# Patient Record
Sex: FEMALE | Race: WHITE | NOT HISPANIC OR LATINO | RURAL
[De-identification: names, ages, dates, MRNs, and addresses within clinical notes are randomized per-mention and may not be internally consistent; named-entity substitution may affect disease eponyms.]

---

## 2020-12-22 ENCOUNTER — HISTORICAL (OUTPATIENT)
Dept: ADMINISTRATIVE | Facility: HOSPITAL | Age: 30
End: 2020-12-22

## 2020-12-22 LAB
AMPHET UR QL SCN: POSITIVE NG/ML
BACTERIA #/AREA URNS HPF: ABNORMAL /HPF
BARBITURATES UR QL SCN: NEGATIVE NG/ML
BENZODIAZ METAB UR QL SCN: POSITIVE NG/ML
BILIRUB UR QL STRIP: NEGATIVE MG/DL
CANNABINOIDS UR QL SCN: POSITIVE NG/ML
CLARITY UR: CLEAR
CLARITY UR: CLEAR
COCAINE UR QL SCN: NEGATIVE NG/ML
COLOR UR: ABNORMAL
COLOR UR: ABNORMAL
GLUCOSE UR STRIP-MCNC: NEGATIVE MG/DL
KETONES UR STRIP-SCNC: NEGATIVE MG/DL
LEUKOCYTE ESTERASE UR QL STRIP: ABNORMAL LEU/UL
NITRITE UR QL STRIP: POSITIVE
OPIATES UR QL SCN: POSITIVE NG/ML
PCP UR QL SCN: NEGATIVE NG/ML
PH UR STRIP: 6 [PH] (ref 5–8)
PROT UR QL STRIP: NEGATIVE MG/DL
RBC # UR STRIP: NEGATIVE ERY/UL
RBC #/AREA URNS HPF: ABNORMAL /HPF (ref 0–3)
SARS-COV-2 RNA AMPLIFICATION, QUAL: NEGATIVE
SP GR UR STRIP: <=1.005 (ref 1–1.03)
SQUAMOUS #/AREA URNS LPF: ABNORMAL /LPF
UROBILINOGEN UR STRIP-ACNC: 0.2 EU/DL
WBC #/AREA URNS HPF: ABNORMAL /HPF (ref 0–5)

## 2020-12-23 ENCOUNTER — HISTORICAL (OUTPATIENT)
Dept: ADMINISTRATIVE | Facility: HOSPITAL | Age: 30
End: 2020-12-23

## 2020-12-23 LAB
ABO: NORMAL
ANION GAP SERPL CALCULATED.3IONS-SCNC: 10 MMOL/L
ANTIBODY SCREEN: NORMAL
APTT PPP: 41.3 SECONDS (ref 25.2–37.3)
BASOPHILS # BLD AUTO: 0.04 X10E3/UL (ref 0–0.2)
BASOPHILS NFR BLD AUTO: 0.4 % (ref 0–1)
BUN SERPL-MCNC: 4 MG/DL (ref 7–18)
CALCIUM SERPL-MCNC: 8 MG/DL (ref 8.5–10.1)
CHLORIDE SERPL-SCNC: 99 MMOL/L (ref 98–107)
CO2 SERPL-SCNC: 29 MMOL/L (ref 21–32)
CREAT SERPL-MCNC: 0.58 MG/DL (ref 0.55–1.02)
EOSINOPHIL # BLD AUTO: 0.2 X10E3/UL (ref 0–0.5)
EOSINOPHIL NFR BLD AUTO: 1.8 % (ref 1–4)
ERYTHROCYTE [DISTWIDTH] IN BLOOD BY AUTOMATED COUNT: 13.3 % (ref 11.5–14.5)
GLUCOSE SERPL-MCNC: 136 MG/DL (ref 74–106)
HCT VFR BLD AUTO: 27.7 % (ref 38–47)
HGB BLD-MCNC: 9 G/DL (ref 12–16)
IMM GRANULOCYTES # BLD AUTO: 0.05 X10E3/UL (ref 0–0.04)
IMM GRANULOCYTES NFR BLD: 0.4 % (ref 0–0.4)
INR BLD: 1.18 (ref 0–3.3)
LYMPHOCYTES # BLD AUTO: 2.5 X10E3/UL (ref 1–4.8)
LYMPHOCYTES NFR BLD AUTO: 22.1 % (ref 27–41)
MCH RBC QN AUTO: 29.2 PG (ref 27–31)
MCHC RBC AUTO-ENTMCNC: 32.5 G/DL (ref 32–36)
MCV RBC AUTO: 89.9 FL (ref 80–96)
MONOCYTES # BLD AUTO: 0.84 X10E3/UL (ref 0–0.8)
MONOCYTES NFR BLD AUTO: 7.4 % (ref 2–6)
MPC BLD CALC-MCNC: 10 FL (ref 9.4–12.4)
NEUTROPHILS # BLD AUTO: 7.7 X10E3/UL (ref 1.8–7.7)
NEUTROPHILS NFR BLD AUTO: 67.9 % (ref 53–65)
NRBC # BLD AUTO: 0 X10E3/UL (ref 0–0)
NRBC, AUTO (.00): 0 /100 (ref 0–0)
PLATELET # BLD AUTO: 321 X10E3/UL (ref 150–400)
POTASSIUM SERPL-SCNC: 3.5 MMOL/L (ref 3.5–5.1)
PROTHROMBIN TIME: 14.4 SECONDS (ref 11.7–14.7)
RBC # BLD AUTO: 3.08 X10E6/UL (ref 4.2–5.4)
RH TYPE: NORMAL
SODIUM SERPL-SCNC: 134 MMOL/L (ref 136–145)
WBC # BLD AUTO: 11.33 X10E3/UL (ref 4.5–11)

## 2020-12-24 ENCOUNTER — HISTORICAL (OUTPATIENT)
Dept: ADMINISTRATIVE | Facility: HOSPITAL | Age: 30
End: 2020-12-24

## 2020-12-24 LAB
ANION GAP SERPL CALCULATED.3IONS-SCNC: 11 MMOL/L
BASOPHILS # BLD AUTO: 0.03 X10E3/UL (ref 0–0.2)
BASOPHILS NFR BLD AUTO: 0.3 % (ref 0–1)
BUN SERPL-MCNC: 2 MG/DL (ref 7–18)
CALCIUM SERPL-MCNC: 7.9 MG/DL (ref 8.5–10.1)
CHLORIDE SERPL-SCNC: 103 MMOL/L (ref 98–107)
CO2 SERPL-SCNC: 28 MMOL/L (ref 21–32)
CREAT SERPL-MCNC: 0.54 MG/DL (ref 0.55–1.02)
EOSINOPHIL # BLD AUTO: 0.17 X10E3/UL (ref 0–0.5)
EOSINOPHIL NFR BLD AUTO: 1.6 % (ref 1–4)
ERYTHROCYTE [DISTWIDTH] IN BLOOD BY AUTOMATED COUNT: 13.2 % (ref 11.5–14.5)
GLUCOSE SERPL-MCNC: 122 MG/DL (ref 74–106)
HCT VFR BLD AUTO: 28.9 % (ref 38–47)
HGB BLD-MCNC: 9.1 G/DL (ref 12–16)
IMM GRANULOCYTES # BLD AUTO: 0.08 X10E3/UL (ref 0–0.04)
IMM GRANULOCYTES NFR BLD: 0.8 % (ref 0–0.4)
LYMPHOCYTES # BLD AUTO: 2.06 X10E3/UL (ref 1–4.8)
LYMPHOCYTES NFR BLD AUTO: 19.9 % (ref 27–41)
MCH RBC QN AUTO: 29.4 PG (ref 27–31)
MCHC RBC AUTO-ENTMCNC: 31.5 G/DL (ref 32–36)
MCV RBC AUTO: 93.2 FL (ref 80–96)
MONOCYTES # BLD AUTO: 0.53 X10E3/UL (ref 0–0.8)
MONOCYTES NFR BLD AUTO: 5.1 % (ref 2–6)
MPC BLD CALC-MCNC: 9.7 FL (ref 9.4–12.4)
NEUTROPHILS # BLD AUTO: 7.47 X10E3/UL (ref 1.8–7.7)
NEUTROPHILS NFR BLD AUTO: 72.3 % (ref 53–65)
NRBC # BLD AUTO: 0 X10E3/UL (ref 0–0)
NRBC, AUTO (.00): 0 /100 (ref 0–0)
PLATELET # BLD AUTO: 280 X10E3/UL (ref 150–400)
POTASSIUM SERPL-SCNC: 3.4 MMOL/L (ref 3.5–5.1)
RBC # BLD AUTO: 3.1 X10E6/UL (ref 4.2–5.4)
REPORT: 38
REPORT: NORMAL
SODIUM SERPL-SCNC: 139 MMOL/L (ref 136–145)
WBC # BLD AUTO: 10.34 X10E3/UL (ref 4.5–11)

## 2020-12-25 ENCOUNTER — HISTORICAL (OUTPATIENT)
Dept: ADMINISTRATIVE | Facility: HOSPITAL | Age: 30
End: 2020-12-25

## 2020-12-25 LAB
ALBUMIN SERPL BCP-MCNC: 1.8 G/DL (ref 3.5–5)
ALBUMIN/GLOB SERPL: 0.5 {RATIO}
ALP SERPL-CCNC: 86 U/L (ref 37–98)
ALT SERPL W P-5'-P-CCNC: 23 U/L (ref 13–56)
ANION GAP SERPL CALCULATED.3IONS-SCNC: 10 MMOL/L
AST SERPL W P-5'-P-CCNC: 17 U/L (ref 15–37)
BASOPHILS # BLD AUTO: 0.01 X10E3/UL (ref 0–0.2)
BASOPHILS NFR BLD AUTO: 0.1 % (ref 0–1)
BILIRUB SERPL-MCNC: 0.3 MG/DL (ref 0–1.2)
BUN SERPL-MCNC: 3 MG/DL (ref 7–18)
BUN/CREAT SERPL: 6.8
CALCIUM SERPL-MCNC: 7.8 MG/DL (ref 8.5–10.1)
CHLORIDE SERPL-SCNC: 105 MMOL/L (ref 98–107)
CO2 SERPL-SCNC: 30 MMOL/L (ref 21–32)
CREAT SERPL-MCNC: 0.44 MG/DL (ref 0.55–1.02)
EOSINOPHIL # BLD AUTO: 0 X10E3/UL (ref 0–0.5)
EOSINOPHIL NFR BLD AUTO: 0 % (ref 1–4)
ERYTHROCYTE [DISTWIDTH] IN BLOOD BY AUTOMATED COUNT: 13 % (ref 11.5–14.5)
GLOBULIN SER-MCNC: 4 G/DL (ref 2–4)
GLUCOSE SERPL-MCNC: 149 MG/DL (ref 74–106)
HCT VFR BLD AUTO: 23.8 % (ref 38–47)
HGB BLD-MCNC: 7.5 G/DL (ref 12–16)
IMM GRANULOCYTES # BLD AUTO: 0.03 X10E3/UL (ref 0–0.04)
IMM GRANULOCYTES NFR BLD: 0.3 % (ref 0–0.4)
LYMPHOCYTES # BLD AUTO: 1.42 X10E3/UL (ref 1–4.8)
LYMPHOCYTES NFR BLD AUTO: 14.9 % (ref 27–41)
MCH RBC QN AUTO: 29.2 PG (ref 27–31)
MCHC RBC AUTO-ENTMCNC: 31.5 G/DL (ref 32–36)
MCV RBC AUTO: 92.6 FL (ref 80–96)
MONOCYTES # BLD AUTO: 0.5 X10E3/UL (ref 0–0.8)
MONOCYTES NFR BLD AUTO: 5.2 % (ref 2–6)
MPC BLD CALC-MCNC: 10.1 FL (ref 9.4–12.4)
NEUTROPHILS # BLD AUTO: 7.57 X10E3/UL (ref 1.8–7.7)
NEUTROPHILS NFR BLD AUTO: 79.5 % (ref 53–65)
NRBC # BLD AUTO: 0 X10E3/UL (ref 0–0)
NRBC, AUTO (.00): 0 /100 (ref 0–0)
PLATELET # BLD AUTO: 305 X10E3/UL (ref 150–400)
POTASSIUM SERPL-SCNC: 4.1 MMOL/L (ref 3.5–5.1)
PROT SERPL-MCNC: 5.8 G/DL (ref 6.4–8.2)
RBC # BLD AUTO: 2.57 X10E6/UL (ref 4.2–5.4)
SODIUM SERPL-SCNC: 141 MMOL/L (ref 136–145)
WBC # BLD AUTO: 9.53 X10E3/UL (ref 4.5–11)

## 2021-01-07 ENCOUNTER — HISTORICAL (OUTPATIENT)
Dept: ADMINISTRATIVE | Facility: HOSPITAL | Age: 31
End: 2021-01-07

## 2021-01-17 ENCOUNTER — HISTORICAL (OUTPATIENT)
Dept: ADMINISTRATIVE | Facility: HOSPITAL | Age: 31
End: 2021-01-17

## 2021-01-17 LAB
BACTERIA #/AREA URNS HPF: ABNORMAL /HPF
BASOPHILS # BLD AUTO: 0.04 X10E3/UL (ref 0–0.2)
BASOPHILS NFR BLD AUTO: 0.3 % (ref 0–1)
BILIRUB UR QL STRIP: NEGATIVE MG/DL
CLARITY UR: CLEAR
CLARITY UR: CLEAR
COLOR UR: ABNORMAL
COLOR UR: ABNORMAL
EOSINOPHIL # BLD AUTO: 0.3 X10E3/UL (ref 0–0.5)
EOSINOPHIL NFR BLD AUTO: 2.1 % (ref 1–4)
ERYTHROCYTE [DISTWIDTH] IN BLOOD BY AUTOMATED COUNT: 14.9 % (ref 11.5–14.5)
GLUCOSE UR STRIP-MCNC: NEGATIVE MG/DL
HCG UR QL IA.RAPID: NEGATIVE
HCT VFR BLD AUTO: 29.6 % (ref 38–47)
HGB BLD-MCNC: 8.9 G/DL (ref 12–16)
IMM GRANULOCYTES # BLD AUTO: 0.07 X10E3/UL (ref 0–0.04)
IMM GRANULOCYTES NFR BLD: 0.5 % (ref 0–0.4)
KETONES UR STRIP-SCNC: NEGATIVE MG/DL
LEUKOCYTE ESTERASE UR QL STRIP: ABNORMAL LEU/UL
LYMPHOCYTES # BLD AUTO: 2.69 X10E3/UL (ref 1–4.8)
LYMPHOCYTES NFR BLD AUTO: 19.2 % (ref 27–41)
MCH RBC QN AUTO: 27.1 PG (ref 27–31)
MCHC RBC AUTO-ENTMCNC: 30.1 G/DL (ref 32–36)
MCV RBC AUTO: 90 FL (ref 80–96)
MONOCYTES # BLD AUTO: 0.85 X10E3/UL (ref 0–0.8)
MONOCYTES NFR BLD AUTO: 6.1 % (ref 2–6)
MPC BLD CALC-MCNC: 10 FL (ref 9.4–12.4)
NEUTROPHILS # BLD AUTO: 10.05 X10E3/UL (ref 1.8–7.7)
NEUTROPHILS NFR BLD AUTO: 71.8 % (ref 53–65)
NITRITE UR QL STRIP: POSITIVE
NRBC # BLD AUTO: 0 X10E3/UL (ref 0–0)
NRBC, AUTO (.00): 0 /100 (ref 0–0)
PH UR STRIP: 5.5 PH UNITS (ref 5–8)
PLATELET # BLD AUTO: 453 X10E3/UL (ref 150–400)
PROT UR QL STRIP: NEGATIVE MG/DL
RBC # BLD AUTO: 3.29 X10E6/UL (ref 4.2–5.4)
RBC # UR STRIP: NEGATIVE ERY/UL
RBC #/AREA URNS HPF: ABNORMAL /HPF (ref 0–3)
SP GR UR STRIP: >=1.03 (ref 1–1.03)
SQUAMOUS #/AREA URNS LPF: ABNORMAL /LPF
UROBILINOGEN UR STRIP-ACNC: 0.2 EU/DL
WBC # BLD AUTO: 14 X10E3/UL (ref 4.5–11)
WBC #/AREA URNS HPF: ABNORMAL /HPF (ref 0–5)

## 2021-01-19 LAB
REPORT: 38
REPORT: NORMAL

## 2023-10-31 ENCOUNTER — HOSPITAL ENCOUNTER (EMERGENCY)
Facility: HOSPITAL | Age: 33
Discharge: HOME | End: 2023-10-31
Attending: EMERGENCY MEDICINE
Payer: MEDICARE

## 2023-10-31 VITALS
SYSTOLIC BLOOD PRESSURE: 98 MMHG | HEART RATE: 62 BPM | BODY MASS INDEX: 26.13 KG/M2 | WEIGHT: 142 LBS | RESPIRATION RATE: 20 BRPM | DIASTOLIC BLOOD PRESSURE: 55 MMHG | TEMPERATURE: 98 F | OXYGEN SATURATION: 100 % | HEIGHT: 62 IN

## 2023-10-31 DIAGNOSIS — R11.2 NAUSEA AND VOMITING, UNSPECIFIED VOMITING TYPE: ICD-10-CM

## 2023-10-31 DIAGNOSIS — F11.93 NARCOTIC WITHDRAWAL: Primary | ICD-10-CM

## 2023-10-31 DIAGNOSIS — R19.7 DIARRHEA, UNSPECIFIED TYPE: ICD-10-CM

## 2023-10-31 PROCEDURE — 63600175 PHARM REV CODE 636 W HCPCS: Performed by: EMERGENCY MEDICINE

## 2023-10-31 PROCEDURE — 99284 EMERGENCY DEPT VISIT MOD MDM: CPT | Mod: ,,, | Performed by: EMERGENCY MEDICINE

## 2023-10-31 PROCEDURE — 25000003 PHARM REV CODE 250: Performed by: EMERGENCY MEDICINE

## 2023-10-31 PROCEDURE — 99284 EMERGENCY DEPT VISIT MOD MDM: CPT | Mod: 25

## 2023-10-31 PROCEDURE — 96365 THER/PROPH/DIAG IV INF INIT: CPT

## 2023-10-31 PROCEDURE — 99284 PR EMERGENCY DEPT VISIT,LEVEL IV: ICD-10-PCS | Mod: ,,, | Performed by: EMERGENCY MEDICINE

## 2023-10-31 PROCEDURE — 96375 TX/PRO/DX INJ NEW DRUG ADDON: CPT

## 2023-10-31 RX ORDER — PROMETHAZINE HYDROCHLORIDE 25 MG/1
25 SUPPOSITORY RECTAL EVERY 6 HOURS PRN
Qty: 12 SUPPOSITORY | Refills: 0 | Status: SHIPPED | OUTPATIENT
Start: 2023-10-31

## 2023-10-31 RX ORDER — PROMETHAZINE HYDROCHLORIDE 25 MG/1
25 TABLET ORAL EVERY 6 HOURS PRN
Qty: 16 TABLET | Refills: 0 | Status: SHIPPED | OUTPATIENT
Start: 2023-10-31

## 2023-10-31 RX ORDER — MORPHINE SULFATE 4 MG/ML
8 INJECTION, SOLUTION INTRAMUSCULAR; INTRAVENOUS
Status: COMPLETED | OUTPATIENT
Start: 2023-10-31 | End: 2023-10-31

## 2023-10-31 RX ADMIN — MORPHINE SULFATE 8 MG: 4 INJECTION, SOLUTION INTRAMUSCULAR; INTRAVENOUS at 08:10

## 2023-10-31 RX ADMIN — SODIUM CHLORIDE 1000 ML: 9 INJECTION, SOLUTION INTRAVENOUS at 09:10

## 2023-10-31 RX ADMIN — PROMETHAZINE HYDROCHLORIDE 25 MG: 25 INJECTION INTRAMUSCULAR; INTRAVENOUS at 09:10

## 2023-10-31 NOTE — DISCHARGE INSTRUCTIONS
Follow up in clinic with pain management tomorrow as scheduled.  Return to emergency department for any worsening or further problems.  Take Phenergan as needed for nausea and vomiting.

## 2023-10-31 NOTE — ED PROVIDER NOTES
Encounter Date: 10/31/2023       History     Chief Complaint   Patient presents with    Vomiting    Addiction Problem     Patient is a 33-year-old female who normally takes for 10 mg Percocets a day.  Four days ago she lost her Percocet and has had nausea, vomiting, and diarrhea since that time.  Patient did find to Percocet pills but states she was unable to keep them down.  Patient denies fever, cough, or other acute problems or complaints.  Patient believes her symptoms are all related to narcotic withdrawal.  Patient states this has happened to her once in the past.      Review of patient's allergies indicates:  No Known Allergies  No past medical history on file.  No past surgical history on file.  No family history on file.     Review of Systems   Gastrointestinal:  Positive for abdominal pain, diarrhea, nausea and vomiting.   All other systems reviewed and are negative.      Physical Exam     Initial Vitals   BP Pulse Resp Temp SpO2   10/31/23 0810 10/31/23 0810 10/31/23 0810 10/31/23 0810 10/31/23 0812   108/74 79 17 97.5 °F (36.4 °C) 98 %      MAP       --                Physical Exam    Nursing note and vitals reviewed.  Constitutional: She appears well-developed and well-nourished.   HENT:   Head: Normocephalic.   Eyes: Pupils are equal, round, and reactive to light.   Cardiovascular:  Normal rate.           Pulmonary/Chest: Breath sounds normal.   Abdominal: Abdomen is soft.     Neurological: She is alert.   Skin: Skin is warm. Capillary refill takes less than 2 seconds.   Psychiatric: She has a normal mood and affect.         Medical Screening Exam   See Full Note    ED Course   Procedures  Labs Reviewed - No data to display       Imaging Results    None          Medications   sodium chloride 0.9% bolus 1,000 mL 1,000 mL (has no administration in time range)   morphine injection 8 mg (has no administration in time range)   promethazine (PHENERGAN) 25 mg in dextrose 5 % (D5W) 50 mL IVPB (has no  administration in time range)     Medical Decision Making  Risk  Prescription drug management.               ED Course as of 10/31/23 0859   Tue Oct 31, 2023   2398 Medical decision-making:  Differential diagnosis includes nausea, vomiting, abdominal pain, diarrhea, narcotic withdrawal symptoms.  No labs or imaging were performed on this patient.  Patient was treated in the emergency department with 1 L bolus of saline, 25 mg of Phenergan IV, and 8 mg of morphine IV which resulted in improved symptoms. [BB]      ED Course User Index  [BB] Francis Mac MD                    Clinical Impression:   Final diagnoses:  [F11.93] Narcotic withdrawal (Primary)  [R11.2] Nausea and vomiting, unspecified vomiting type  [R19.7] Diarrhea, unspecified type        ED Disposition Condition    Discharge Stable          ED Prescriptions       Medication Sig Dispense Start Date End Date Auth. Provider    promethazine (PHENERGAN) 25 MG tablet Take 1 tablet (25 mg total) by mouth every 6 (six) hours as needed. 16 tablet 10/31/2023 -- Francis Mac MD    promethazine (PHENERGAN) 25 MG suppository Place 1 suppository (25 mg total) rectally every 6 (six) hours as needed. 12 suppository 10/31/2023 -- Francis Mac MD          Follow-up Information    None          Francis Mac MD  10/31/23 0859

## 2023-10-31 NOTE — ED TRIAGE NOTES
Pt reports losing her percocet 4 days ago and finding them yesterday. She reports she started withdrawing and vomiting. She reports she took them yesterday but has still been vomiting.

## 2024-03-05 DIAGNOSIS — M79.651 RIGHT THIGH PAIN: Primary | ICD-10-CM

## 2024-06-19 ENCOUNTER — OFFICE VISIT (OUTPATIENT)
Dept: WOUND CARE | Facility: CLINIC | Age: 34
End: 2024-06-19
Attending: FAMILY MEDICINE
Payer: MEDICARE

## 2024-06-19 VITALS
SYSTOLIC BLOOD PRESSURE: 118 MMHG | RESPIRATION RATE: 18 BRPM | TEMPERATURE: 97 F | HEART RATE: 102 BPM | DIASTOLIC BLOOD PRESSURE: 78 MMHG

## 2024-06-19 DIAGNOSIS — L89.314 PRESSURE INJURY OF RIGHT BUTTOCK, STAGE 4: ICD-10-CM

## 2024-06-19 DIAGNOSIS — L97.912 NON-PRESSURE CHRONIC ULCER OF RIGHT LOWER LEG WITH FAT LAYER EXPOSED: Primary | ICD-10-CM

## 2024-06-19 DIAGNOSIS — I73.9 PVD (PERIPHERAL VASCULAR DISEASE): ICD-10-CM

## 2024-06-19 DIAGNOSIS — L89.154 PRESSURE ULCER OF SACRAL REGION, STAGE 4: ICD-10-CM

## 2024-06-19 DIAGNOSIS — L89.324 PRESSURE INJURY OF LEFT BUTTOCK, STAGE 4: ICD-10-CM

## 2024-06-19 PROCEDURE — 99214 OFFICE O/P EST MOD 30 MIN: CPT | Mod: PBBFAC | Performed by: NURSE PRACTITIONER

## 2024-06-19 PROCEDURE — 99204 OFFICE O/P NEW MOD 45 MIN: CPT | Mod: S$PBB,,, | Performed by: NURSE PRACTITIONER

## 2024-06-19 PROCEDURE — 87077 CULTURE AEROBIC IDENTIFY: CPT | Mod: ,,, | Performed by: CLINICAL MEDICAL LABORATORY

## 2024-06-19 PROCEDURE — 87075 CULTR BACTERIA EXCEPT BLOOD: CPT | Mod: ,,, | Performed by: CLINICAL MEDICAL LABORATORY

## 2024-06-19 PROCEDURE — 87186 SC STD MICRODIL/AGAR DIL: CPT | Mod: ,,, | Performed by: CLINICAL MEDICAL LABORATORY

## 2024-06-19 PROCEDURE — 87070 CULTURE OTHR SPECIMN AEROBIC: CPT | Mod: ,,, | Performed by: CLINICAL MEDICAL LABORATORY

## 2024-06-19 PROCEDURE — 87077 CULTURE AEROBIC IDENTIFY: CPT | Mod: XU,,, | Performed by: CLINICAL MEDICAL LABORATORY

## 2024-06-19 PROCEDURE — 99999 PR PBB SHADOW E&M-EST. PATIENT-LVL IV: CPT | Mod: PBBFAC,,, | Performed by: NURSE PRACTITIONER

## 2024-06-19 RX ORDER — GENTAMICIN SULFATE 1 MG/G
CREAM TOPICAL DAILY
Qty: 90 G | Refills: 2 | Status: SHIPPED | OUTPATIENT
Start: 2024-06-19

## 2024-06-19 RX ORDER — SULFAMETHOXAZOLE AND TRIMETHOPRIM 800; 160 MG/1; MG/1
1 TABLET ORAL 2 TIMES DAILY
Qty: 28 TABLET | Refills: 0 | Status: SHIPPED | OUTPATIENT
Start: 2024-06-19 | End: 2024-07-17

## 2024-06-19 RX ORDER — SILVER SULFADIAZINE 10 G/1000G
CREAM TOPICAL DAILY
Qty: 400 G | Refills: 2 | Status: SHIPPED | OUTPATIENT
Start: 2024-06-19

## 2024-06-19 NOTE — PROGRESS NOTES
KATY Vieira   [facility]  [address]  [facility] [address]  [phone]      PATIENT NAME: Radha Choi  : 1990  DATE: 24  MRN: [id number]      Billing Provider: KATY Vieira  Level of Service:   Patient PCP Information       Provider PCP Type    Primary Doctor No General            Reason for Visit / Chief Complaint: Pressure Ulcer and Wound Care (Left and right hip, sacral, right great toe, right ankle, left heel)       History of Present Illness / Problem Focused Workflow     Radha Choi is a 35 yo female presents to clinic with complaints of pressure ulcers to right and left buttock, sacral, bilateral lower extremities and ulcer with exposed hardware to right lower extremity. She reports that she has been managing wounds at home and that wounds to sacral and buttock are chronic in nature. Reports that she had hardware placed in  by Dr. Garrido. She states that hardware has been exposed for awhile but over the past few weeks she has noticed a strong odor and increased drainage. She had appointment and imaging scheduled in March with orthopedics but missed those appointments. Discussed importance of x-ray today and that hardware needs to be evaluated by orthopedic surgeon. Arterial doppler scheduled. Pertinent PMH includes paraplegia. Wound healing is complicated by multiple co-morbidities, poor vascular supply, immunosuppression, edema, heavy drainage, excessive wound moisture and macerated tissue, decreased granulation tissue, necrosis, large surface area, large volume, tract(s), undermining, fragile skin, and infection.            Review of Systems     Review of Systems   Constitutional:  Positive for activity change. Negative for chills and fever.   Respiratory:  Negative for chest tightness and shortness of breath.    Cardiovascular:  Positive for leg swelling. Negative for chest pain and palpitations.    Musculoskeletal:  Positive for arthralgias and joint swelling.   Skin:  Positive for color change and wound.        wound   Neurological:  Positive for weakness and numbness.   Psychiatric/Behavioral:  Negative for agitation, behavioral problems, confusion and self-injury.        Medical / Social / Family History   History reviewed. No pertinent past medical history.    History reviewed. No pertinent surgical history.    Social History  Ms. Radha Choi  reports that she has been smoking cigarettes. She has never used smokeless tobacco.    Family History  Ms.'s Radha Choi family history is not on file.    Medications and Allergies     Medications  Outpatient Medications Marked as Taking for the 6/19/24 encounter (Office Visit) with Gunjan Sharma FNP   Medication Sig Dispense Refill    gabapentin (NEURONTIN) 800 MG tablet Take 1 tablet four times a day for 30 day(s) 120 tablet 1    oxyCODONE-acetaminophen (PERCOCET)  mg per tablet Take 1 tablet by mouth 4 (four) times daily. 120 tablet 0    promethazine (PHENERGAN) 25 MG suppository Place 1 suppository (25 mg total) rectally every 6 (six) hours as needed. 12 suppository 0    promethazine (PHENERGAN) 25 MG tablet Take 1 tablet (25 mg total) by mouth every 6 (six) hours as needed. 16 tablet 0       Allergies  Review of patient's allergies indicates:  No Known Allergies    Physical Examination     Vitals:    06/19/24 1359   BP: 118/78   Pulse: 102   Resp: 18   Temp: 97.3 °F (36.3 °C)     Physical Exam  Vitals and nursing note reviewed.   Constitutional:       Appearance: Normal appearance.   HENT:      Head: Normocephalic.   Cardiovascular:      Rate and Rhythm: Normal rate and regular rhythm.      Pulses: Normal pulses.      Heart sounds: Normal heart sounds.   Pulmonary:      Effort: Pulmonary effort is normal. No respiratory distress.   Chest:      Chest wall: No tenderness.   Musculoskeletal:         General: Swelling present.      Right  lower leg: Edema present.      Left lower leg: Edema present.   Skin:     General: Skin is warm and dry.      Findings: Erythema present.      Comments: See LDA for photos/measurements   Neurological:      General: No focal deficit present.      Mental Status: She is alert and oriented to person, place, and time. Mental status is at baseline.   Psychiatric:         Mood and Affect: Mood normal.         Behavior: Behavior normal.         Thought Content: Thought content normal.         Judgment: Judgment normal.       Assessment and Plan             Wound 06/19/24 1330 Abscess Right lower;medial Leg #1 (Active)   06/19/24 1330   Present on Original Admission: Y   Primary Wound Type: Abscess   Side: Right   Orientation: lower;medial   Location: Leg   Wound Approximate Age at First Assessment (Weeks): 8 weeks   Wound Number: #1   Is this injury device related?:    Incision Type:    Closure Method:    Wound Description (Comments):    Type:    Additional Comments:    Ankle-Brachial Index:    Pulses:    Removal Indication and Assessment:    Wound Outcome:    Wound Image   06/19/24 1346   Drainage Amount Moderate 06/19/24 1346   Drainage Characteristics/Odor Yellow 06/19/24 1346   Appearance Pink;Red 06/19/24 1346   Black (%), Wound Tissue Color 0 % 06/19/24 1346   Red (%), Wound Tissue Color 80 % 06/19/24 1346   Yellow (%), Wound Tissue Color 20 % 06/19/24 1346   Periwound Area Intact 06/19/24 1346   Wound Edges Irregular 06/19/24 1346   Wound Length (cm) 1.8 cm 06/19/24 1346   Wound Width (cm) 1.8 cm 06/19/24 1346   Wound Depth (cm) 0.4 cm 06/19/24 1346   Wound Volume (cm^3) 1.296 cm^3 06/19/24 1346   Wound Surface Area (cm^2) 3.24 cm^2 06/19/24 1346   Care Cleansed with:;Antimicrobial agent 06/19/24 1346   Dressing Applied;Hydrofiber;Gauze;Rolled gauze 06/19/24 1346            Wound 06/19/24 1332 Traumatic Right plantar Toe, first #2 (Active)   06/19/24 1332   Present on Original Admission: Y   Primary Wound Type:  Traumatic   Side: Right   Orientation: plantar   Location: Toe, first   Wound Approximate Age at First Assessment (Weeks): 8 weeks   Wound Number: #2   Is this injury device related?:    Incision Type:    Closure Method:    Wound Description (Comments):    Type:    Additional Comments:    Ankle-Brachial Index:    Pulses:    Removal Indication and Assessment:    Wound Outcome:    Wound Image   06/19/24 1350   Dressing Appearance Open to air;No dressing 06/19/24 1350   Wound Length (cm) 3.8 cm 06/19/24 1350   Wound Width (cm) 2.5 cm 06/19/24 1350   Wound Depth (cm) 0.2 cm 06/19/24 1350   Wound Volume (cm^3) 1.9 cm^3 06/19/24 1350   Wound Surface Area (cm^2) 9.5 cm^2 06/19/24 1350   Care Cleansed with:;Soap and water;Applied:;Moisturizing agent;Other (see comments) 06/19/24 1350   Dressing Applied;Gauze;Rolled gauze;Other (comment) 06/19/24 1350   Periwound Care Moisture barrier applied 06/19/24 1350            Wound 06/19/24 1333 Pressure Injury Left Heel #3 (Active)   06/19/24 1333   Present on Original Admission: Y   Primary Wound Type: Pressure inj   Side: Left   Orientation:    Location: Heel   Wound Approximate Age at First Assessment (Weeks): 8 weeks   Wound Number: #3   Is this injury device related?:    Incision Type:    Closure Method:    Wound Description (Comments):    Type:    Additional Comments:    Ankle-Brachial Index:    Pulses:    Removal Indication and Assessment:    Wound Outcome:    Wound Image   06/19/24 1347   Drainage Amount Moderate 06/19/24 1347   Drainage Characteristics/Odor Yellow 06/19/24 1347   Appearance Yellow;Slough 06/19/24 1347   Wound Length (cm) 0.4 cm 06/19/24 1347   Wound Width (cm) 0.3 cm 06/19/24 1347   Wound Depth (cm) 0.1 cm 06/19/24 1347   Wound Volume (cm^3) 0.012 cm^3 06/19/24 1347   Wound Surface Area (cm^2) 0.12 cm^2 06/19/24 1347   Care Cleansed with:;Antimicrobial agent 06/19/24 1347   Dressing Applied;Gauze;Rolled gauze 06/19/24 1342            Wound 06/19/24 8993  Pressure Injury lower Sacral spine #4 (Active)   06/19/24 1334   Present on Original Admission: Y   Primary Wound Type: Pressure inj   Side:    Orientation: lower   Location: Sacral spine   Wound Approximate Age at First Assessment (Weeks): 8 weeks   Wound Number: #4   Is this injury device related?:    Incision Type:    Closure Method:    Wound Description (Comments):    Type:    Additional Comments:    Ankle-Brachial Index:    Pulses:    Removal Indication and Assessment:    Wound Outcome:    Wound Image    06/19/24 1340   Pressure Injury Stage 4 06/19/24 1340   Dressing Appearance Dry;Intact;Clean 06/19/24 1340   Drainage Amount Moderate 06/19/24 1340   Drainage Characteristics/Odor Yellow 06/19/24 1340   Appearance Pink;Red;Yellow;Slough;Moist;Granulating;Adipose;Muscle 06/19/24 1340   Black (%), Wound Tissue Color 0 % 06/19/24 1340   Red (%), Wound Tissue Color 80 % 06/19/24 1340   Yellow (%), Wound Tissue Color 20 % 06/19/24 1340   Periwound Area Intact 06/19/24 1340   Wound Edges Irregular;Undefined 06/19/24 1340   Wound Length (cm) 1.9 cm 06/19/24 1340   Wound Width (cm) 2.2 cm 06/19/24 1340   Wound Depth (cm) 1 cm 06/19/24 1340   Wound Volume (cm^3) 4.18 cm^3 06/19/24 1340   Wound Surface Area (cm^2) 4.18 cm^2 06/19/24 1340   Care Cleansed with:;Antimicrobial agent 06/19/24 1340   Dressing Applied;Hydrofiber;Gauze;[place]/border 06/19/24 1340            Wound 06/19/24 1335 Pressure Injury Right Buttocks #5 (Active)   06/19/24 1335   Present on Original Admission: Y   Primary Wound Type: Pressure inj   Side: Right   Orientation:    Location: Buttocks   Wound Approximate Age at First Assessment (Weeks):    Wound Number: #5   Is this injury device related?:    Incision Type:    Closure Method:    Wound Description (Comments):    Type:    Additional Comments:    Ankle-Brachial Index:    Pulses:    Removal Indication and Assessment:    Wound Outcome:    Wound Image   06/19/24 1344   Drainage Amount Moderate  06/19/24 1344   Drainage Characteristics/Odor Yellow 06/19/24 1344   Appearance Pink;Red;Yellow;Slough;Moist;Granulating;Adipose 06/19/24 1344   Wound Length (cm) 3.5 cm 06/19/24 1344   Wound Width (cm) 2 cm 06/19/24 1344   Wound Depth (cm) 2.7 cm 06/19/24 1344   Wound Volume (cm^3) 18.9 cm^3 06/19/24 1344   Wound Surface Area (cm^2) 7 cm^2 06/19/24 1344   Care Cleansed with:;Antimicrobial agent 06/19/24 1344   Dressing Applied;Hydrofiber;Absorptive Pad;Other (comment) 06/19/24 1344            Wound 06/19/24 1336 Pressure Injury Left Buttocks #6 (Active)   06/19/24 1336   Present on Original Admission: Y   Primary Wound Type: Pressure inj   Side: Left   Orientation:    Location: Buttocks   Wound Approximate Age at First Assessment (Weeks): 8 weeks   Wound Number: #6   Is this injury device related?:    Incision Type:    Closure Method:    Wound Description (Comments):    Type:    Additional Comments:    Ankle-Brachial Index:    Pulses:    Removal Indication and Assessment:    Wound Outcome:    Wound Image   06/19/24 1338   Pressure Injury Stage 4 06/19/24 1338   Dressing Appearance Intact;Clean 06/19/24 1338   Drainage Amount Moderate 06/19/24 1338   Drainage Characteristics/Odor Yellow 06/19/24 1338   Appearance Pink;Red;Yellow;Slough;Moist;Granulating;Adipose 06/19/24 1338   Black (%), Wound Tissue Color 0 % 06/19/24 1338   Red (%), Wound Tissue Color 80 % 06/19/24 1338   Yellow (%), Wound Tissue Color 20 % 06/19/24 1338   Periwound Area Moist;[place] 06/19/24 1338   Wound Edges Irregular;Undefined 06/19/24 1338   Wound Length (cm) 2 cm 06/19/24 1338   Wound Width (cm) 1.5 cm 06/19/24 1338   Wound Depth (cm) 3 cm 06/19/24 1338   Wound Volume (cm^3) 9 cm^3 06/19/24 1338   Wound Surface Area (cm^2) 3 cm^2 06/19/24 1338   Care Cleansed with: 06/19/24 1338   Dressing Applied;Hydrofiber;Absorptive Pad;Other (comment) 06/19/24 1338            Wound 06/19/24 1337 Pressure Injury Left plantar Toe, first #7 (Active)    06/19/24 1337   Present on Original Admission: Y   Primary Wound Type: Pressure inj   Side: Left   Orientation: plantar   Location: Toe, first   Wound Approximate Age at First Assessment (Weeks): 8 weeks   Wound Number: #7   Is this injury device related?:    Incision Type:    Closure Method:    Wound Description (Comments):    Type:    Additional Comments:    Ankle-Brachial Index:    Pulses:    Removal Indication and Assessment:    Wound Outcome:    Wound Image   06/19/24 1352     Problem List Items Addressed This Visit          Orthopedic    Non-pressure chronic ulcer of right lower leg with fat layer exposed - Primary    Overview              Current Assessment & Plan     Right great toe:  Clean with vashe, pat dry, and apply silvadene, cover with gauze pad. Change daily and as needed for soilage. Use moisturizer to dry areas on feet.    Sacral, left hip, and right hip:  Clean wounds with acetic acid (1 cup white vinegar and 3 cups of water)  Apply gentamicin to wound then drawtex, cover with ABD pad, secure with paper tape. Change daily and as needed for soilage.    Right medial ankle:  Cleanse with vashe and pat dry, apply ergotul then drawtex, and cover with gauze pad, then wrap with rolled gauze.    Diet:   Increase protein intake, avoid fried, fatty foods and foods high in simple carbs.   Vitamins:  Take vitamin C 1000 mg, zinc 50mg, vitamin d 5000 units, and a daily multivitamin. Mark is a good source of protein and nutrients to aid in wound healing.   Monitor closely for s/s of infection including fever, chills, increase in pain, odor from wound, and increased redness from foot. Go to ER if any complications develop.   Home Health to change dressings.    Arterial doppler and xray on 7/11/2024 at 10:00am  Bactrim ds take 1 tablet by mouth twice per day for 14 days         Relevant Orders    Culture, Anaerobe (Completed)    Culture, Wound (Completed)    X-Ray Tibia Fibula 2 View Right    Pressure ulcer of  sacral region, stage 4    Overview              Current Assessment & Plan     Right great toe:  Clean with vashe, pat dry, and apply silvadene, cover with gauze pad. Change daily and as needed for soilage. Use moisturizer to dry areas on feet.    Sacral, left hip, and right hip:  Clean wounds with acetic acid (1 cup white vinegar and 3 cups of water)  Apply gentamicin to wound then drawtex, cover with ABD pad, secure with paper tape. Change daily and as needed for soilage.    Right medial ankle:  Cleanse with vashe and pat dry, apply ergotul then drawtex, and cover with gauze pad, then wrap with rolled gauze.    Diet:   Increase protein intake, avoid fried, fatty foods and foods high in simple carbs.   Vitamins:  Take vitamin C 1000 mg, zinc 50mg, vitamin d 5000 units, and a daily multivitamin. Mark is a good source of protein and nutrients to aid in wound healing.   Monitor closely for s/s of infection including fever, chills, increase in pain, odor from wound, and increased redness from foot. Go to ER if any complications develop.   Home Health to change dressings.    Arterial doppler and xray on 7/11/2024 at 10:00am  Bactrim ds take 1 tablet by mouth twice per day for 14 days         Pressure injury of left buttock, stage 4    Overview              Current Assessment & Plan     Right great toe:  Clean with vashe, pat dry, and apply silvadene, cover with gauze pad. Change daily and as needed for soilage. Use moisturizer to dry areas on feet.    Sacral, left hip, and right hip:  Clean wounds with acetic acid (1 cup white vinegar and 3 cups of water)  Apply gentamicin to wound then drawtex, cover with ABD pad, secure with paper tape. Change daily and as needed for soilage.    Right medial ankle:  Cleanse with vashe and pat dry, apply ergotul then drawtex, and cover with gauze pad, then wrap with rolled gauze.    Diet:   Increase protein intake, avoid fried, fatty foods and foods high in simple carbs.    Vitamins:  Take vitamin C 1000 mg, zinc 50mg, vitamin d 5000 units, and a daily multivitamin. Mark is a good source of protein and nutrients to aid in wound healing.   Monitor closely for s/s of infection including fever, chills, increase in pain, odor from wound, and increased redness from foot. Go to ER if any complications develop.   Home Health to change dressings.    Arterial doppler and xray on 7/11/2024 at 10:00am  Bactrim ds take 1 tablet by mouth twice per day for 14 days         Pressure injury of right buttock, stage 4    Overview              Current Assessment & Plan     Right great toe:  Clean with vashe, pat dry, and apply silvadene, cover with gauze pad. Change daily and as needed for soilage. Use moisturizer to dry areas on feet.    Sacral, left hip, and right hip:  Clean wounds with acetic acid (1 cup white vinegar and 3 cups of water)  Apply gentamicin to wound then drawtex, cover with ABD pad, secure with paper tape. Change daily and as needed for soilage.    Right medial ankle:  Cleanse with vashe and pat dry, apply ergotul then drawtex, and cover with gauze pad, then wrap with rolled gauze.    Diet:   Increase protein intake, avoid fried, fatty foods and foods high in simple carbs.   Vitamins:  Take vitamin C 1000 mg, zinc 50mg, vitamin d 5000 units, and a daily multivitamin. Mark is a good source of protein and nutrients to aid in wound healing.   Monitor closely for s/s of infection including fever, chills, increase in pain, odor from wound, and increased redness from foot. Go to ER if any complications develop.   Home Health to change dressings.    Arterial doppler and xray on 7/11/2024 at 10:00am  Bactrim ds take 1 tablet by mouth twice per day for 14 days          Other Visit Diagnoses       PVD (peripheral vascular disease)        Relevant Orders    US Lower Extrem Arteries Bilat with DIANA (xpd)            Future Appointments   Date Time Provider Department Center   7/11/2024 10:00 AM  [facility]             Signature:  KATY Vieira  [facility]  [address] [place] [facility] [address]  [phone]    Date of encounter: 6/19/24

## 2024-06-19 NOTE — PLAN OF CARE
[x]     Assess wound(s) each visit    [x]     Provide wound care    [x]     Perform TCOM evaluation    [x]     Assess response to wound care

## 2024-06-19 NOTE — PLAN OF CARE
[x]     Assess for malnutrition, specifically, chronic causes of protein-energy malnutrition such as Diabetes, immobility, alcoholism/substance abuse, Morbid Obesity, COPD, infection, and increased age.    [x]     Review pertinent labs to evaluate renal function and nutritional status.    [x]     Obtain patient weight.    [x]     Obtain Nutrition Consult based upon nutrition risk assessment protocols.    [x]     Promote sufficient fluid and calories to promote wound healing.    [x]     Recommend vitamin and mineral supplements as needed.   [x]     Exercise as tolerated or progressive resistance activity.

## 2024-06-19 NOTE — PATIENT INSTRUCTIONS
Right great toe:  Clean with vashe, pat dry, and apply silvadene, cover with gauze pad. Change daily and as needed for soilage. Use moisturizer to dry areas on feet.    Sacral, left hip, and right hip:  Clean wounds with acetic acid (1 cup white vinegar and 3 cups of water)  Apply gentamicin to wound then drawtex, cover with ABD pad, secure with paper tape. Change daily and as needed for soilage.    Right medial ankle:  Cleanse with vashe and pat dry, apply ergotul then drawtex, and cover with gauze pad, then wrap with rolled gauze.    Diet:   Increase protein intake, avoid fried, fatty foods and foods high in simple carbs.   Vitamins:  Take vitamin C 1000 mg, zinc 50mg, vitamin d 5000 units, and a daily multivitamin. Mark is a good source of protein and nutrients to aid in wound healing.   Monitor closely for s/s of infection including fever, chills, increase in pain, odor from wound, and increased redness from foot. Go to ER if any complications develop.   Home Health to change dressings.    Arterial doppler and xray on 7/11/2024 at 10:00am  Bactrim ds take 1 tablet by mouth twice per day for 14 days

## 2024-06-19 NOTE — PLAN OF CARE
[x]     Provide antibiotics as ordered. Provide systemic antibiotics immediately in patients with critical limb ischemia, have evidence of limb infection or cellulitis, and/or infected wounds.   [x]     Use aseptic techniques in the provision of wound care.   [x]     Consult Infectious Disease specialist as needed.    [x]     Provide debridement type based upon vascular status and wound assessment to remove necrotic tissue and decrease bioburden.   [x]     Follow Universal Precautions and Infection Control [place] to prevent the cross-contamination of organisms.

## 2024-06-22 LAB
MICROORGANISM SPEC CULT: ABNORMAL
MICROORGANISM SPEC CULT: ABNORMAL

## 2024-06-24 LAB — BACTERIA SPEC ANAEROBE CULT: ABNORMAL

## 2024-06-24 RX ORDER — PENICILLIN V POTASSIUM 500 MG/1
500 TABLET, FILM COATED ORAL EVERY 8 HOURS
Qty: 90 TABLET | Refills: 0 | Status: SHIPPED | OUTPATIENT
Start: 2024-06-24 | End: 2024-07-24

## 2024-07-02 ENCOUNTER — TELEPHONE (OUTPATIENT)
Dept: WOUND CARE | Facility: CLINIC | Age: 34
End: 2024-07-02
Payer: MEDICAID

## 2024-07-02 PROBLEM — L89.154 PRESSURE ULCER OF SACRAL REGION, STAGE 4: Status: ACTIVE | Noted: 2024-07-02

## 2024-07-02 PROBLEM — L89.314 PRESSURE INJURY OF RIGHT BUTTOCK, STAGE 4: Status: ACTIVE | Noted: 2024-07-02

## 2024-07-02 PROBLEM — L97.912 NON-PRESSURE CHRONIC ULCER OF RIGHT LOWER LEG WITH FAT LAYER EXPOSED: Status: ACTIVE | Noted: 2024-07-02

## 2024-07-02 PROBLEM — L89.324 PRESSURE INJURY OF LEFT BUTTOCK, STAGE 4: Status: ACTIVE | Noted: 2024-07-02

## 2024-07-02 NOTE — TELEPHONE ENCOUNTER
[facility] contacted the office to verify patient wound care supplies, obtain wound measurements from recent visit, and to inform that home health was discontinued today. Instructed her that the patient did not make her appointment today and last measurements were on 6/19/24, she verbalizes understanding.